# Patient Record
Sex: FEMALE | Race: WHITE | URBAN - METROPOLITAN AREA
[De-identification: names, ages, dates, MRNs, and addresses within clinical notes are randomized per-mention and may not be internally consistent; named-entity substitution may affect disease eponyms.]

---

## 2021-11-11 ENCOUNTER — HOSPITAL ENCOUNTER (EMERGENCY)
Facility: HOSPITAL | Age: 46
Discharge: HOME | End: 2021-11-11
Attending: EMERGENCY MEDICINE
Payer: COMMERCIAL

## 2021-11-11 ENCOUNTER — APPOINTMENT (OUTPATIENT)
Dept: GENERAL RADIOLOGY | Facility: HOSPITAL | Age: 46
End: 2021-11-11
Attending: EMERGENCY MEDICINE
Payer: COMMERCIAL

## 2021-11-11 ENCOUNTER — APPOINTMENT (OUTPATIENT)
Dept: CV DIAGNOSTICS | Facility: HOSPITAL | Age: 46
End: 2021-11-11
Attending: EMERGENCY MEDICINE
Payer: COMMERCIAL

## 2021-11-11 ENCOUNTER — APPOINTMENT (OUTPATIENT)
Dept: CT IMAGING | Facility: HOSPITAL | Age: 46
End: 2021-11-11
Attending: EMERGENCY MEDICINE
Payer: COMMERCIAL

## 2021-11-11 VITALS
TEMPERATURE: 99 F | DIASTOLIC BLOOD PRESSURE: 64 MMHG | HEIGHT: 65 IN | HEART RATE: 61 BPM | WEIGHT: 230 LBS | RESPIRATION RATE: 16 BRPM | SYSTOLIC BLOOD PRESSURE: 110 MMHG | BODY MASS INDEX: 38.32 KG/M2 | OXYGEN SATURATION: 97 %

## 2021-11-11 DIAGNOSIS — R55 SYNCOPE AND COLLAPSE: ICD-10-CM

## 2021-11-11 DIAGNOSIS — U07.1 COVID-19 VIRUS INFECTION: Primary | ICD-10-CM

## 2021-11-11 PROCEDURE — 85025 COMPLETE CBC W/AUTO DIFF WBC: CPT | Performed by: EMERGENCY MEDICINE

## 2021-11-11 PROCEDURE — 99453 REM MNTR PHYSIOL PARAM SETUP: CPT

## 2021-11-11 PROCEDURE — 93005 ELECTROCARDIOGRAM TRACING: CPT

## 2021-11-11 PROCEDURE — 93010 ELECTROCARDIOGRAM REPORT: CPT | Performed by: EMERGENCY MEDICINE

## 2021-11-11 PROCEDURE — 93306 TTE W/DOPPLER COMPLETE: CPT | Performed by: EMERGENCY MEDICINE

## 2021-11-11 PROCEDURE — 72125 CT NECK SPINE W/O DYE: CPT | Performed by: EMERGENCY MEDICINE

## 2021-11-11 PROCEDURE — 96360 HYDRATION IV INFUSION INIT: CPT

## 2021-11-11 PROCEDURE — 71045 X-RAY EXAM CHEST 1 VIEW: CPT | Performed by: EMERGENCY MEDICINE

## 2021-11-11 PROCEDURE — 72170 X-RAY EXAM OF PELVIS: CPT | Performed by: EMERGENCY MEDICINE

## 2021-11-11 PROCEDURE — 85379 FIBRIN DEGRADATION QUANT: CPT | Performed by: EMERGENCY MEDICINE

## 2021-11-11 PROCEDURE — 70450 CT HEAD/BRAIN W/O DYE: CPT | Performed by: EMERGENCY MEDICINE

## 2021-11-11 PROCEDURE — 84484 ASSAY OF TROPONIN QUANT: CPT | Performed by: EMERGENCY MEDICINE

## 2021-11-11 PROCEDURE — 99285 EMERGENCY DEPT VISIT HI MDM: CPT

## 2021-11-11 PROCEDURE — 80048 BASIC METABOLIC PNL TOTAL CA: CPT | Performed by: EMERGENCY MEDICINE

## 2021-11-11 NOTE — ED PROVIDER NOTES
Patient Seen in: [facility] AND [facility] Emergency Department      History   Patient presents with:  Syncope  Cough/URI    Stated Complaint: syncope    Subjective:   HPI    Patient presents emergency department after having a syncopal episode.   She states that round, and reactive to light. Cardiovascular:      Rate and Rhythm: Normal rate and regular rhythm. Heart sounds: No murmur heard. Pulmonary:      Effort: Pulmonary effort is normal. No respiratory distress.       Breath sounds: Normal breath so LAVENDER   RAINBOW DRAW LIGHT GREEN   RAINBOW DRAW GOLD     EKG    Rate, intervals and axes as noted on EKG Report.   Rate: 66 bpm  Rhythm: Sinus Rhythm  Reading: Normal EKG                       MDM      Pulse Ox: 97%, Normal,     Cardiac Monitor: Pulse Re oxygen or hospitalization and have the following risks factors: BMI 38.     The patient/caregiver has been given the “Fact Sheet for Patients, Parents and Caregivers”, informed of alternatives to receiving authorized Regen-COV, and informed that casirivimab

## 2021-11-11 NOTE — ED QUICK NOTES
Removed c ollar per self. Educated about importance of leaving c collar in place. Remains refusing to wear c collar.

## 2021-11-11 NOTE — ED INITIAL ASSESSMENT (HPI)
at home with covid. +cough. syncopal episode pta. Awake and alert and oriented x4. C/o dizziness. c collar in place from ems. C/o pain to bilateral hips. No deformities noted.

## 2021-11-12 ENCOUNTER — TELEPHONE (OUTPATIENT)
Dept: CASE MANAGEMENT | Age: 46
End: 2021-11-12

## 2021-11-12 NOTE — TELEPHONE ENCOUNTER
Home Monitoring Condition Update    Covid19+ test date: 11/11/21    Consent Verification:  Assessment Completed With: Patient  HIPAA Verified?   Yes    COVID-19 HOME MONITORING 11/12/2021   Temperature 100   SPO2 99   Pulse 65   Pulse taken from Pulse Oxime for at least 24 hours.   • Asymptomatic individuals who are not 14 days past the completion of the COVID vaccine series (2nd dose of Moderna or Pfizer vaccines, or single dose of Pitney Rj vaccine) and who've had a high-risk exposure should [facility] asymptomatic patients, 2 days prior to test specimen collection. This also includes any direct physical contact for less than 10 min, of a COVID positive individual for caregiving activities.     What to do if you are sick with coronavirus disease 2019 ([place] please do not take aspirin if you have a history of ulcers, Crohn's disease, or Ulcerative Colitis. 6. Famotidine (trade name Pepcid): 20 to 40 mg daily for 28 days.     Call ahead before visiting your doctor  If you have a medical appointment, call the he touching your eyes, nose, and mouth with unwashed hands. Clean all \"high-touch\" surfaces every day  High touch surfaces include counters, tabletops, doorknobs, bathroom fixtures, toilets, phones, keyboards, tablets, and beside tables.  Also, clean any your health at home:  If you have possible or confirmed COVID-19:  • Stay home from work, school, and away from other public places. If you must go out, avoid using any kind of public transportation, ridesharing, or taxis.   • Monitor your symptoms carefull in the same household as a COVID-19 positive patient can consider these 5 interventions. These interventions are only recommended for 1-2 months to get past surge periods of time or high risk situations. 1. Aspirin 81 mg daily  2.  Vitamin D 2,000 IU donald

## 2021-11-17 ENCOUNTER — TELEMEDICINE (OUTPATIENT)
Dept: INTERNAL MEDICINE CLINIC | Facility: CLINIC | Age: 46
End: 2021-11-17
Payer: COMMERCIAL

## 2021-11-17 DIAGNOSIS — U07.1 COVID-19 VIRUS INFECTION: Primary | ICD-10-CM
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
## 2021-11-17 DIAGNOSIS — R55 VASOVAGAL SYNCOPE: ICD-10-CM

## 2021-11-17 PROCEDURE — 99205 OFFICE O/P NEW HI 60 MIN: CPT | Performed by: NURSE PRACTITIONER

## 2021-11-17 NOTE — PROGRESS NOTES
TRANSITIONAL CARE CLINIC PHARMACIST MEDICATION RECONCILIATION        Armaan Valenzuela MRN [id number]    1975 PCP None Pcp       Comments: Medication history completed by the [address] [place] [address] Pharmacist with the patient using two-wayradha

## 2021-11-23 PROBLEM — R55 VASOVAGAL SYNCOPE: Status: ACTIVE | Noted: 2021-11-23
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
## 2021-11-23 PROBLEM — U07.1 COVID-19 VIRUS INFECTION: Status: ACTIVE | Noted: 2021-11-23

## 2021-11-23 NOTE — PROGRESS NOTES
Video Visit  [address]      Please note that the following visit was completed using two-way, real-time interactive audio and video communication.   This has been done in good devin to provide continuity of care in the best taste and smell, that is starting to come back. Her appetite is fair and she is mostly snacking but is drinking well. She is going to start on was started on Vitamin C and Vitamin D, and Zinc daily supplements to boost immune system.  She was encouraged to (CST): Sheila Shepherd MD on 11/11/2021 at 12:54 PM          CT SPINE CERVICAL (CPT=72125)    Result Date: 11/11/2021  CONCLUSION:  1. No acute fracture or traumatic subluxation of the cervical spine.  2. Mild-to-moderate multilevel cervical spine degenerat states normal range of motion of extremities, + body aches  NEUROLOGIC: denies confusion  PSYCHIATRIC: denies depression or anxiety    PHYSICAL EXAM:  There were no vitals filed for this visit.     GENERAL: well developed, well nourished, in no apparent dis mouth as needed. , Disp: , Rfl:       Requested Prescriptions      No prescriptions requested or ordered in this encounter         Health Maintenance:  Annual Physical Never done  COVID-19 Vaccine(1) Never done  Annual Depression Screen Never done  Pap Smea 10:15 AM Valentine Bowman MD [id number] [facility] 429        1. Transitional Care Clinic Visit: Discharged  2. PCP Visit: 12/8/2021  3.   Chronic Care Management: N/A    Total time spent: 40 minutes    COVID positive patients will receive QOD follow up per hos